# Patient Record
Sex: FEMALE | Race: OTHER | URBAN - METROPOLITAN AREA
[De-identification: names, ages, dates, MRNs, and addresses within clinical notes are randomized per-mention and may not be internally consistent; named-entity substitution may affect disease eponyms.]

---

## 2019-05-08 ENCOUNTER — EMERGENCY (EMERGENCY)
Facility: HOSPITAL | Age: 29
LOS: 1 days | Discharge: HOME | End: 2019-05-08
Attending: EMERGENCY MEDICINE | Admitting: EMERGENCY MEDICINE
Payer: COMMERCIAL

## 2019-05-08 VITALS
HEART RATE: 80 BPM | SYSTOLIC BLOOD PRESSURE: 101 MMHG | RESPIRATION RATE: 18 BRPM | TEMPERATURE: 98 F | DIASTOLIC BLOOD PRESSURE: 69 MMHG | OXYGEN SATURATION: 100 %

## 2019-05-08 VITALS
TEMPERATURE: 98 F | RESPIRATION RATE: 18 BRPM | SYSTOLIC BLOOD PRESSURE: 100 MMHG | HEART RATE: 70 BPM | DIASTOLIC BLOOD PRESSURE: 64 MMHG | OXYGEN SATURATION: 100 %

## 2019-05-08 LAB
ANION GAP SERPL CALC-SCNC: 13 MMOL/L — SIGNIFICANT CHANGE UP (ref 5–17)
APPEARANCE UR: CLEAR — SIGNIFICANT CHANGE UP
BASOPHILS # BLD AUTO: 0.02 K/UL — SIGNIFICANT CHANGE UP (ref 0–0.2)
BASOPHILS NFR BLD AUTO: 0.4 % — SIGNIFICANT CHANGE UP (ref 0–2)
BILIRUB UR-MCNC: NEGATIVE — SIGNIFICANT CHANGE UP
BLD GP AB SCN SERPL QL: NEGATIVE — SIGNIFICANT CHANGE UP
BUN SERPL-MCNC: 13 MG/DL — SIGNIFICANT CHANGE UP (ref 7–23)
CALCIUM SERPL-MCNC: 8.7 MG/DL — SIGNIFICANT CHANGE UP (ref 8.4–10.5)
CHLORIDE SERPL-SCNC: 106 MMOL/L — SIGNIFICANT CHANGE UP (ref 96–108)
CO2 SERPL-SCNC: 21 MMOL/L — LOW (ref 22–31)
COLOR SPEC: YELLOW — SIGNIFICANT CHANGE UP
CREAT SERPL-MCNC: 0.71 MG/DL — SIGNIFICANT CHANGE UP (ref 0.5–1.3)
DIFF PNL FLD: ABNORMAL
EOSINOPHIL # BLD AUTO: 0.08 K/UL — SIGNIFICANT CHANGE UP (ref 0–0.5)
EOSINOPHIL NFR BLD AUTO: 1.4 % — SIGNIFICANT CHANGE UP (ref 0–6)
GLUCOSE SERPL-MCNC: 84 MG/DL — SIGNIFICANT CHANGE UP (ref 70–99)
GLUCOSE UR QL: NEGATIVE — SIGNIFICANT CHANGE UP
HCG SERPL-ACNC: 1997 MIU/ML — HIGH
HCT VFR BLD CALC: 33.6 % — LOW (ref 34.5–45)
HGB BLD-MCNC: 10.7 G/DL — LOW (ref 11.5–15.5)
IMM GRANULOCYTES NFR BLD AUTO: 0.2 % — SIGNIFICANT CHANGE UP (ref 0–1.5)
KETONES UR-MCNC: ABNORMAL MG/DL
LEUKOCYTE ESTERASE UR-ACNC: ABNORMAL
LYMPHOCYTES # BLD AUTO: 1.71 K/UL — SIGNIFICANT CHANGE UP (ref 1–3.3)
LYMPHOCYTES # BLD AUTO: 30.8 % — SIGNIFICANT CHANGE UP (ref 13–44)
MCHC RBC-ENTMCNC: 29 PG — SIGNIFICANT CHANGE UP (ref 27–34)
MCHC RBC-ENTMCNC: 31.8 GM/DL — LOW (ref 32–36)
MCV RBC AUTO: 91.1 FL — SIGNIFICANT CHANGE UP (ref 80–100)
MONOCYTES # BLD AUTO: 0.51 K/UL — SIGNIFICANT CHANGE UP (ref 0–0.9)
MONOCYTES NFR BLD AUTO: 9.2 % — SIGNIFICANT CHANGE UP (ref 2–14)
NEUTROPHILS # BLD AUTO: 3.23 K/UL — SIGNIFICANT CHANGE UP (ref 1.8–7.4)
NEUTROPHILS NFR BLD AUTO: 58 % — SIGNIFICANT CHANGE UP (ref 43–77)
NITRITE UR-MCNC: NEGATIVE — SIGNIFICANT CHANGE UP
NRBC # BLD: 0 /100 WBCS — SIGNIFICANT CHANGE UP (ref 0–0)
PH UR: 7 — SIGNIFICANT CHANGE UP (ref 5–8)
PLATELET # BLD AUTO: 255 K/UL — SIGNIFICANT CHANGE UP (ref 150–400)
POTASSIUM SERPL-MCNC: 4.5 MMOL/L — SIGNIFICANT CHANGE UP (ref 3.5–5.3)
POTASSIUM SERPL-SCNC: 4.5 MMOL/L — SIGNIFICANT CHANGE UP (ref 3.5–5.3)
PROT UR-MCNC: ABNORMAL MG/DL
RBC # BLD: 3.69 M/UL — LOW (ref 3.8–5.2)
RBC # FLD: 14.8 % — HIGH (ref 10.3–14.5)
RH IG SCN BLD-IMP: POSITIVE — SIGNIFICANT CHANGE UP
RH IG SCN BLD-IMP: POSITIVE — SIGNIFICANT CHANGE UP
SODIUM SERPL-SCNC: 140 MMOL/L — SIGNIFICANT CHANGE UP (ref 135–145)
SP GR SPEC: 1.02 — SIGNIFICANT CHANGE UP (ref 1–1.03)
UROBILINOGEN FLD QL: 2 E.U./DL
WBC # BLD: 5.56 K/UL — SIGNIFICANT CHANGE UP (ref 3.8–10.5)
WBC # FLD AUTO: 5.56 K/UL — SIGNIFICANT CHANGE UP (ref 3.8–10.5)

## 2019-05-08 PROCEDURE — 99284 EMERGENCY DEPT VISIT MOD MDM: CPT

## 2019-05-08 PROCEDURE — 81001 URINALYSIS AUTO W/SCOPE: CPT

## 2019-05-08 PROCEDURE — 80048 BASIC METABOLIC PNL TOTAL CA: CPT

## 2019-05-08 PROCEDURE — 99281 EMR DPT VST MAYX REQ PHY/QHP: CPT

## 2019-05-08 PROCEDURE — 86900 BLOOD TYPING SEROLOGIC ABO: CPT

## 2019-05-08 PROCEDURE — 76801 OB US < 14 WKS SINGLE FETUS: CPT | Mod: 26

## 2019-05-08 PROCEDURE — 86850 RBC ANTIBODY SCREEN: CPT

## 2019-05-08 PROCEDURE — 86901 BLOOD TYPING SEROLOGIC RH(D): CPT

## 2019-05-08 PROCEDURE — 85025 COMPLETE CBC W/AUTO DIFF WBC: CPT

## 2019-05-08 PROCEDURE — 76817 TRANSVAGINAL US OBSTETRIC: CPT

## 2019-05-08 PROCEDURE — 36415 COLL VENOUS BLD VENIPUNCTURE: CPT

## 2019-05-08 PROCEDURE — 76801 OB US < 14 WKS SINGLE FETUS: CPT

## 2019-05-08 PROCEDURE — 76817 TRANSVAGINAL US OBSTETRIC: CPT | Mod: 26

## 2019-05-08 PROCEDURE — 84702 CHORIONIC GONADOTROPIN TEST: CPT

## 2019-05-08 NOTE — ED ADULT NURSE NOTE - CAS EDN DISCHARGE INTERVENTIONS
Pt called re: getting an appt with Dr Marely Crockett- Pt has never been seen in our office but wants to establish care with Dr Marely Crockett- states initially wanted to be seen for weight loss but was sick last week and went to ER in [place] seen for gallbladder stones
IV discontinued, cath removed intact

## 2019-05-08 NOTE — ED ADULT NURSE NOTE - OBJECTIVE STATEMENT
A1 ~ 6 weeks gravid referred to r/o ectopic pregnancy and quotes "my OB didn't see the sac and told me to come here." Denies any pain/discomfort, vaginal bleeding or abnormal vaginal discharge.

## 2019-05-08 NOTE — ED ADULT NURSE NOTE - CHPI ED NUR SYMPTOMS NEG
no nausea/no abdominal pain/no back pain/no pain/no vaginal discharge/no vomiting/no coffee grounds emesis/no fever

## 2019-05-08 NOTE — ED PROVIDER NOTE - NSFOLLOWUPINSTRUCTIONS_ED_ALL_ED_FT
Prenatal Care  Prenatal care is health care during pregnancy. It helps you and your unborn baby (fetus) stay as healthy as possible. Prenatal care may be provided by a midwife, a family practice health care provider, or a childbirth and pregnancy specialist (obstetrician).    How does this affect me?  During pregnancy, you will be closely monitored for any new conditions that might develop. To lower your risk of pregnancy complications, you and your health care provider will talk about any underlying conditions you have.    How does this affect my baby?  Early and consistent prenatal care increases the chance that your baby will be healthy during pregnancy. Prenatal care lowers the risk that your baby will be:  Born early (prematurely).  Smaller than expected at birth (small for gestational age).  What can I expect at the first prenatal care visit?  Your first prenatal care visit will likely be the longest. You should schedule your first prenatal care visit as soon as you know that you are pregnant. Your first visit is a good time to talk about any questions or concerns you have about pregnancy. At your visit, you and your health care provider will talk about:  Your medical history, including:  Any past pregnancies.  Your family's medical history.  The baby's father's medical history.  Any long-term (chronic) health conditions you have and how you manage them.  Any surgeries or procedures you have had.  Any current over-the-counter or prescription medicines, herbs, or supplements you are taking.  Other factors that could pose a risk to your baby, including:  Your home setting and your stress levels, including:  Exposure to abuse or violence.  Household financial strain.  Mental health conditions you have.  Your daily health habits, including diet and exercise.  Your health care provider will also:  Measure your weight, height, and blood pressure.  Do a physical exam, including a pelvic and breast exam.  Perform blood tests and urine tests to check for:  Urinary tract infection.  Sexually transmitted infections (STIs).  Low iron levels in your blood (anemia).  Blood type and certain proteins on red blood cells (Rh antibodies).  Infections and immunity to viruses, such as hepatitis B and rubella.  HIV (human immunodeficiency virus).  Do an ultrasound to confirm your baby's growth and development and to help predict your estimated due date (VASYL). This ultrasound is done with a probe that is inserted into the vagina (transvaginal ultrasound).  Discuss your options for genetic screening.  Give you information about how to keep yourself and your baby healthy, including:  Nutrition and taking vitamins.  Physical activity.  How to manage pregnancy symptoms such as nausea and vomiting (morning sickness).  Infections and substances that may be harmful to your baby and how to avoid them.  Food safety.  Dental care.  Working.  Travel.  Warning signs to watch for and when to call your health care provider.  How often will I have prenatal care visits?  After your first prenatal care visit, you will have regular visits throughout your pregnancy. The visit schedule is often as follows:  Up to week 28 of pregnancy: once every 4 weeks.  28–36 weeks: once every 2 weeks.  After 36 weeks: every week until delivery.  Some women may have visits more or less often depending on any underlying health conditions and the health of the baby.    Keep all follow-up and prenatal care visits as told by your health care provider. This is important.    What happens during routine prenatal care visits?  ImageYour health care provider will:  Measure your weight and blood pressure.  Check for fetal heart sounds.  Measure the height of your uterus in your abdomen (fundal height). This may be measured starting around week 20 of pregnancy.  Check the position of your baby inside your uterus.  Ask questions about your diet, sleeping patterns, and whether you can feel the baby move.  Review warning signs to watch for and signs of labor.  Ask about any pregnancy symptoms you are having and how you are dealing with them. Symptoms may include:  Headaches.  Nausea and vomiting.  Vaginal discharge.  Swelling.  Fatigue.  Constipation.  Any discomfort, including back or pelvic pain.  Make a list of questions to ask your health care provider at your routine visits.    What tests might I have during prenatal care visits?  You may have blood, urine, and imaging tests throughout your pregnancy, such as:  Urine tests to check for glucose, protein, or signs of infection.  Glucose tests to check for a form of diabetes that can develop during pregnancy (gestational diabetes mellitus). This is usually done around week 24 of pregnancy.  An ultrasound to check your baby's growth and development and to check for birth defects. This is usually done around week 20 of pregnancy.  A test to check for group B strep (GBS) infection. This is usually done around week 36 of pregnancy.  Genetic testing. This may include blood or imaging tests, such as an ultrasound. Some genetic tests are done during the first trimester and some are done during the second trimester.  What else can I expect during prenatal care visits?  Your health care provider may recommend getting certain vaccines during pregnancy. These may include:  A yearly flu shot (annual influenza vaccine). This is especially important if you will be pregnant during flu season.  Tdap (tetanus, diphtheria, pertussis) vaccine. Getting this vaccine during pregnancy can protect your baby from whooping cough (pertussis) after birth. This vaccine may be recommended between weeks 27 and 36 of pregnancy.  Later in your pregnancy, your health care provider may give you information about:  Childbirth and breastfeeding classes.  Choosing a health care provider for your baby.  Umbilical cord banking.  Breastfeeding.  Birth control after your baby is born.  The [facility] labor and delivery unit and how to tour it.  Registering at the hospital before you go into labor.  Where to find more information  Office on Women's Health: womenshealth.gov  American Pregnancy Association: americanpregnancy.org  March of Dimes: marchofdimes.org  Summary  Prenatal care helps you and your baby stay as healthy as possible during pregnancy.  Your first prenatal care visit will most likely be the longest.  You will have visits and tests throughout your pregnancy to monitor your health and your baby's health.  Bring a list of questions to your visits to ask your health care provider.  Make sure to keep all follow-up and prenatal care visits with your health care provider.  This information is not intended to replace advice given to you by your health care provider. Make sure you discuss any questions you have with your health care provider.

## 2019-05-08 NOTE — CONSULT NOTE ADULT - SUBJECTIVE AND OBJECTIVE BOX
29y   with Last Menstrual Period  , presenting for r/u ectropic pregnancy.  Denies fever, chills, chest pain, palpitations, SOB, n/v.  +flatus, +BM. Denies vaginal bleeding.  Pt found out she was pregnant on  with a positive urine HCG test. on  was evaluated by her GYN with serum  and suspected IUP GS on US.  3 days later on  Pt was sent to the ED at [facility] with HCG levels of 1397 and the same US findings.  Pt presented today for a repeat HCG and sono check - completely asymptomatic.    OB H/x:  G1 -  vTOP w/ D&C.  G2 -   at 37 weeks induced for PROM, weight 2693g.    GYN H/x:  Chlamydia infection 2017  (treated).    MED H/x:  none    SURG H/x:  s/p 2018 L/S Gastric sleeve (lost 90 pounds).    Medications:  none    Allergies:   none.     Vital Signs Last 24 Hrs  T(C): 36.8 (08 May 2019 09:48), Max: 36.8 (08 May 2019 09:48)  T(F): 98.3 (08 May 2019 09:48), Max: 98.3 (08 May 2019 09:48)  HR: 80 (08 May 2019 09:48) (80 - 80)  BP: 101/69 (08 May 2019 09:48) (101/69 - 101/69)  RR: 18 (08 May 2019 09:48) (18 - 18)  SpO2: 100% (08 May 2019 09:48) (100% - 100%)    Physical Exam:  Gen: NAD, comfortable  GI: soft, nontender, nondistended + BS, no rebound no guarding  BME: patient differed exam.  Spec: patient differed exam.  Ext: no edema, erythema, tenderness     LABS:                        10.7   5.56  )-----------( 255      ( 08 May 2019 10:52 )             33.6         140  |  106  |  13  ----------------------------<  84  4.5   |  21<L>  |  0.71    Ca    8.7      08 May 2019 10:52        Urinalysis Basic - ( 08 May 2019 13:26 )    Color: Yellow / Appearance: Clear / S.020 / pH: x  Gluc: x / Ketone: Trace mg/dL  / Bili: Negative / Urobili: 2.0 E.U./dL   Blood: x / Protein: Trace mg/dL / Nitrite: NEGATIVE   Leuk Esterase: Trace / RBC: < 5 /HPF / WBC 5-10 /HPF   Sq Epi: x / Non Sq Epi: Moderate /HPF / Bacteria: Present /HPF      RADIOLOGY & ADDITIONAL STUDIES:    EXAM:  US OB LES THAN 14 WKS 1ST GEST                          EXAM:  US OB TRANSVAGINAL                          PROCEDURE DATE:  2019      INTERPRETATION:  OBSTETRICAL ULTRASOUND - FIRST TRIMESTER dated 2019   11:46 AM    INDICATION: First trimester pregnancy. Evaluate for intrauterine   gestation versus ectopic gestation. Beta hCG . LMP: 3/27/2019    TECHNIQUE: Transabdominal views of the pelvis were obtained followed by   transvaginal views for better visualization of the endometrial cavity.      PRIOR STUDIES: None    FINDINGS:   By dates, the estimated gestational age is 6 weeks 0 days.    A single intrauterine gestation is visible with an average ultrasound age   of 4 weeks 6 days.   Mean sac diameter is 0.42 cm, corresponding to a gestational age of 4   weeks 6 days.  The fetal pole and yolk sac are not visualized on this examination, due   to early gestational age. No ectopic gestation is identified.    A normal amount of amniotic fluid is evident. No subchorionic bleed is   visible on this examination.  The cervix is closed with a length of 3.7   cm.    The uterus is anteverted. The uterus is 9.4 x 4.9 x 6.2 cm.  No   myometrial abnormalities are seen. A nabothian cyst is noted within the   cervix. Trace free fluid is noted within the cul-de-sac.    The right ovary is normal in size, measuring 2.2 x 1.5 x 1.8 cm. No right   ovarian masses are seen. The left ovary is normal in size, measuring 2.8   x 2.2 x 2.3 cm. There is a 1.4 x 1.4 x 1.5 cm complex cystic structure   with echogenic rim within the left ovary, compatible with a corpus   luteum.     Doppler evaluation demonstrates flow to both ovaries with no evidence of   torsion.    IMPRESSION:   An intrauterine gestational sac is identified within the endometrial   cavity, with an ultrasound age corresponding to 4 weeks 6 days. No   ectopic gestation is identified. Recommend follow-up beta hCG and repeat   ultrasound in 10-14 days.        "Thank you for the opportunity to participatein the care of this   patient."    ISIS ROCHA M.D., RADIOLOGY RESIDENT  This document has been electronically signed.  SERVANDO QUINTANA M.D., ATTENDING RADIOLOGIST  This document has been electronically signed. May  8 2019  1:01PM

## 2019-05-08 NOTE — CONSULT NOTE ADULT - ASSESSMENT
Pt was seen with Dr. Dodd.  Pt is stable with an IUP pregnancy too young to declare its viability.  HCG levels rise but not yet doubling HCG levels - 2000, but with progression on Sono (a clear GS is seen today).  This may still be a viable (and desired) pregnancy vs. Blighted ovum.  a serial HCG levels should be preformed and a repeat sono as well.  Pt. will come to the ED in 3 days for re-evaluation.  No acute GYN distress or interventional is needed at tis time.

## 2019-05-08 NOTE — ED PROVIDER NOTE - PROGRESS NOTE DETAILS
Disc with gyn, Advised to come to the ED in 3 days, unable to come in 48 hours- for re-evaluation for rpt beta and US.

## 2019-05-08 NOTE — ED ADULT TRIAGE NOTE - CHIEF COMPLAINT QUOTE
pt  states 6 weeks pregnant and referred to ED for r/o ectopic vs miscarriage. denies any cramping, bleeding, discharge, pain.

## 2019-05-08 NOTE — ED PROVIDER NOTE - CLINICAL SUMMARY MEDICAL DECISION MAKING FREE TEXT BOX
Pt here for pregnancy evaluation, labs, US likely from early gestation, will have pt return for rpt US and beta in one week, no abdominal pain, vaginal bleeding currently.

## 2019-05-08 NOTE — ED PROVIDER NOTE - OBJECTIVE STATEMENT
Pt previously healthy here for evaluation for pregnancy, LMP 6 weeks ago 5/2 Hcg 774, 5/5 1357 states outpt US not showing IUP. No assoc vaginal bleeding, cramping, lightheadedness, syncope, leg swelling or other complaints.

## 2019-05-12 DIAGNOSIS — O26.891 OTHER SPECIFIED PREGNANCY RELATED CONDITIONS, FIRST TRIMESTER: ICD-10-CM

## 2019-05-12 DIAGNOSIS — Z3A.01 LESS THAN 8 WEEKS GESTATION OF PREGNANCY: ICD-10-CM
